# Patient Record
Sex: FEMALE | Race: WHITE | NOT HISPANIC OR LATINO | Employment: FULL TIME | URBAN - METROPOLITAN AREA
[De-identification: names, ages, dates, MRNs, and addresses within clinical notes are randomized per-mention and may not be internally consistent; named-entity substitution may affect disease eponyms.]

---

## 2021-01-19 ENCOUNTER — OFFICE VISIT (OUTPATIENT)
Dept: MEDICAL GROUP | Facility: PHYSICIAN GROUP | Age: 61
End: 2021-01-19
Payer: COMMERCIAL

## 2021-01-19 VITALS
WEIGHT: 197 LBS | OXYGEN SATURATION: 95 % | TEMPERATURE: 98.1 F | BODY MASS INDEX: 36.25 KG/M2 | DIASTOLIC BLOOD PRESSURE: 86 MMHG | HEIGHT: 62 IN | HEART RATE: 82 BPM | SYSTOLIC BLOOD PRESSURE: 130 MMHG | RESPIRATION RATE: 16 BRPM

## 2021-01-19 DIAGNOSIS — F17.210 CIGARETTE NICOTINE DEPENDENCE WITHOUT COMPLICATION: ICD-10-CM

## 2021-01-19 DIAGNOSIS — E66.3 OVERWEIGHT: ICD-10-CM

## 2021-01-19 DIAGNOSIS — G89.29 CHRONIC RIGHT-SIDED LOW BACK PAIN WITH RIGHT-SIDED SCIATICA: ICD-10-CM

## 2021-01-19 DIAGNOSIS — Z80.3 FAMILY HISTORY OF BREAST CANCER: ICD-10-CM

## 2021-01-19 DIAGNOSIS — Z11.59 NEED FOR HEPATITIS C SCREENING TEST: ICD-10-CM

## 2021-01-19 DIAGNOSIS — M54.41 CHRONIC RIGHT-SIDED LOW BACK PAIN WITH RIGHT-SIDED SCIATICA: ICD-10-CM

## 2021-01-19 DIAGNOSIS — Z12.31 ENCOUNTER FOR SCREENING MAMMOGRAM FOR BREAST CANCER: ICD-10-CM

## 2021-01-19 DIAGNOSIS — L98.9 SCALP LESION: ICD-10-CM

## 2021-01-19 DIAGNOSIS — M54.16 LUMBAR RADICULOPATHY: ICD-10-CM

## 2021-01-19 PROBLEM — M54.50 CHRONIC LOW BACK PAIN: Status: ACTIVE | Noted: 2021-01-19

## 2021-01-19 PROCEDURE — 99204 OFFICE O/P NEW MOD 45 MIN: CPT | Performed by: INTERNAL MEDICINE

## 2021-01-19 SDOH — HEALTH STABILITY: MENTAL HEALTH: HOW OFTEN DO YOU HAVE A DRINK CONTAINING ALCOHOL?: 4 OR MORE TIMES A WEEK

## 2021-01-19 ASSESSMENT — PATIENT HEALTH QUESTIONNAIRE - PHQ9: CLINICAL INTERPRETATION OF PHQ2 SCORE: 0

## 2021-01-19 NOTE — ASSESSMENT & PLAN NOTE
This is a chronic condition.  Strongly advised the patient is important to quit smoking.  The patient however is not ready.

## 2021-01-19 NOTE — PROGRESS NOTES
CC: Establish care  Scalp lesion  Sciatica      HPI: This is a 60 y.o. pt.  Pt's medical history is notable for:     Chronic low back pain  Chronic cond  Sx noted since aug 2020  Location: LS region  Context: denies recent trauma or injury.   Severity: moderate  Quality of pain: sharp, throbbing   Duration: up to several hrs  Associating symptoms: Denies bowel/bladder dysfunction. Denies saddle anesthesia. Denies sudden onset of weakness in lower ext bilat. Denies F/C. Pain radiating onto RLE   Associating w numbness  Aggravating factors: spine movements, prolonged standing, walking    Cigarette nicotine dependence without complication  This is a chronic condition.  Strongly advised the patient is important to quit smoking.  The patient however is not ready.    Family history of breast cancer  Patient reported her sister was diagnosed with breast cancer two times.  Mammogram ordered for follow-up.  The patient is asymptomatic.    Lumbar radiculopathy  This is a chronic condition noted since August 2020.  Patient reported that her symptom has gotten worse since packing and moving to [place] from [place].  Patient complaining of numbness and tingling sensation affecting the low back radiating onto the right lower extremities.  She denies bowel bladder dysfunction    Scalp lesion  Patient complains of chronic scalp lesion noted in the left parietal region since the past 2 years.  Patient requests referral to see dermatologist.          REVIEW OF SYSTEMS:       Eyes: no changes in vision  ENT: no sore throat, no hearing loss  CV:  no chest pain, no palpitations  Pulmonary: no SOB, no cough    GI: no nausea / vomiting, no diarrhea, no constipation  :  no dysuria, no hematuria       Allergies: Cindy-kit bee sting    No current Epic-ordered outpatient medications on file.     No current [facility]-ordered facility-administered medications on file.        History reviewed. No pertinent past medical history.     History reviewed.  No pertinent surgical history.     Family History   Problem Relation Age of Onset   • Cancer Mother         colon   • Cancer Father    • Cancer Sister         Social History     Tobacco Use   Smoking Status Current Every Day Smoker   • Packs/day: 0.50   Smokeless Tobacco Never Used          Social History     Substance and Sexual Activity   Alcohol Use Yes   • Alcohol/week: 0.6 oz   • Types: 1 Glasses of wine per week   • Frequency: 4 or more times a week         ------------------------------------------------------------------------------     PHYSICAL EXAM:   Vitals:    21 1339   BP: 130/86   Pulse: 82   Resp: 16   Temp: 36.7 °C (98.1 °F)   SpO2: 95%      Body mass index is 36.03 kg/m².         Constitutional: no acute distress  Neck: supple, no JVD  CV: heart RRR  Resp: normal effort, no wheezing or rales.  GI: abdomen soft, no obvious mass, no tenderness  Neuro: CN 2-12 grossly intact  Low back: No significant spinal deformity noted.   Pain noted w palpation of the lumbar region.  ROM : flexion, extension, rotation, lateral bend of back limited due to pain  Scalp in the left parietal region show dry scaly area noted with crusting/plaques.  No fluctuance redness or any discharge noted.        -----------------------------------------------------------------------------    ASSESSMENT:   1. Chronic right-sided low back pain with right-sided sciatica     2. Lumbar radiculopathy  MR-LUMBAR SPINE-W/O   3. Scalp lesion  REFERRAL TO DERMATOLOGY   4. Cigarette nicotine dependence without complication     5. Overweight  HEMOGLOBIN A1C    ALANINE AMINO-TRANS    Basic Metabolic Panel    CBC WITH DIFFERENTIAL    Lipid Profile    TSH    MICROALBUMIN CREAT RATIO URINE   6. Family history of breast cancer     7. Need for hepatitis C screening test  HCV Scrn ( [id number] [facility])   8. Encounter for screening mammogram for breast cancer  MA-SCREENING MAMMO BILAT W/TOMOSYNTHESIS W/CAD           MEDICAL DECISION MAKING:  DISCUSSION / STATUS / PLAN:    Low back pain/lumbar radiculopathy.  This is a chronic condition, uncontrolled.  MRI of the LS spine ordered.  Back care posture stretch exercise recommended advised the patient to follow-up after MRI is done    Scalp lesion.  Chronic, uncontrolled.  Refer the patient to dermatology.    Nicotine dependence.  Strongly advised the patient to quit smoking.    General lab tests ordered today.  Advised the patient to lose weight diet and exercise advised.     Patient reported that she had colonoscopy done 2019.  Advised the patient to obtain the chart for review.     Return in about 6 months (around 7/19/2021).       PATIENT EDUCATION:  -If any problems should arise, patient was advised to contact our office or go to ER to be evaluated.      Please note that this dictation was created using voice recognition software. I have made every reasonable attempt to correct obvious errors, but it is possible there are errors of grammar and possibly content that I did not discover before finalizing the note.

## 2021-01-19 NOTE — ASSESSMENT & PLAN NOTE
Patient reported her sister was diagnosed with breast cancer two times.  Mammogram ordered for follow-up.  The patient is asymptomatic.

## 2021-01-19 NOTE — ASSESSMENT & PLAN NOTE
Chronic cond  Sx noted since aug 2020  Location:  region  Context: denies recent trauma or injury.   Severity: moderate  Quality of pain: sharp, throbbing   Duration: up to several hrs  Associating symptoms: Denies bowel/bladder dysfunction. Denies saddle anesthesia. Denies sudden onset of weakness in lower ext bilat. Denies F/C. Pain radiating onto RLE   Associating w numbness  Aggravating factors: spine movements, prolonged standing, walking

## 2021-01-19 NOTE — ASSESSMENT & PLAN NOTE
Patient complains of chronic scalp lesion noted in the left parietal region since the past 2 years.  Patient requests referral to see dermatologist.

## 2021-01-19 NOTE — ASSESSMENT & PLAN NOTE
This is a chronic condition noted since August 2020.  Patient reported that her symptom has gotten worse since packing and moving to [place] from [place].  Patient complaining of numbness and tingling sensation affecting the low back radiating onto the right lower extremities.  She denies bowel bladder dysfunction

## 2021-02-10 ENCOUNTER — OFFICE VISIT (OUTPATIENT)
Dept: DERMATOLOGY | Facility: IMAGING CENTER | Age: 61
End: 2021-02-10
Payer: COMMERCIAL

## 2021-02-10 DIAGNOSIS — D48.5 NEOPLASM OF UNCERTAIN BEHAVIOR OF SKIN: ICD-10-CM

## 2021-02-10 PROCEDURE — 11105 PUNCH BX SKIN EA SEP/ADDL: CPT | Performed by: DERMATOLOGY

## 2021-02-10 PROCEDURE — 11104 PUNCH BX SKIN SINGLE LESION: CPT | Performed by: DERMATOLOGY

## 2021-02-10 NOTE — PROGRESS NOTES
DERMATOLOGY NOTE  NEW VISIT       Chief complaint: Skin Lesion       HPI/location: lt scalp   Time present: 2013  Painful lesion: No   Itching lesion: Yes  Enlarging lesion: Yes  Anything make it better or worse? no    History of skin cancer: No  History of precancers/actinic keratoses: No  History of biopsies:No  History of blistering/severe sunburns:Yes, Details: child   Family history of skin cancer:Yes, Details: dad,   Family history of atypical moles:No    History reviewed. No pertinent past medical history.     Family History   Problem Relation Age of Onset   • Cancer Mother         colon   • Cancer Father    • Cancer Sister         Social History     Socioeconomic History   • Marital status:      Spouse name: Not on file   • Number of children: Not on file   • Years of education: Not on file   • Highest education level: Not on file   Occupational History   • Not on file   Tobacco Use   • Smoking status: Current Every Day Smoker     Packs/day: 0.50   • Smokeless tobacco: Never Used   Substance and Sexual Activity   • Alcohol use: Yes     Alcohol/week: 0.6 oz     Types: 1 Glasses of wine per week   • Drug use: Never   • Sexual activity: Not on file   Other Topics Concern   • Not on file   Social History Narrative   • Not on file     Social Determinants of Health     Financial Resource Strain:    • Difficulty of Paying Living Expenses:    Food Insecurity:    • Worried About Running Out of Food in the Last Year:    • Ran Out of Food in the Last Year:    Transportation Needs:    • Lack of Transportation (Medical):    • Lack of Transportation (Non-Medical):    Physical Activity:    • Days of Exercise per Week:    • Minutes of Exercise per Session:    Stress:    • Feeling of Stress :    Social Connections:    • Frequency of Communication with Friends and Family:    • Frequency of Social Gatherings with Friends and Family:    • Attends Religion Services:    • Active Member of Clubs or Organizations:    •  Attends Club or Organization Meetings:    • Marital Status:    Intimate Partner Violence:    • Fear of Current or Ex-Partner:    • Emotionally Abused:    • Physically Abused:    • Sexually Abused:         Allergies   Allergen Reactions   • Cindy-Kit Bee Sting Anaphylaxis        MEDICATIONS:  Medications relevant to specialty reviewed.  No current outpatient medications on file.     REVIEW OF SYSTEMS:   Positive for skin (see HPI)  Negative for fevers, chills and cough       EXAM:  There were no vitals taken for this visit.  Constitutional: Well-developed, well-nourished, and in no distress.     A focused skin exam was performed including the affected areas of the head (including face). Notable findings on exam today listed below and/or in assessment/plan.   -see exam in plan       IMPRESSION / PLAN:    1. Neoplasm of uncertain behavior of skin  Exam: left frontal/temporal scalp with large pink somewhat shiny plaque (6cm x 12 cm) with overlying thick hyperkeratotic scales  Remainder of scalp with mild scaling c/w seb derm  - Discussed option for biopsy to aid in diagnosis, patient in agreement  - Biopsy Procedure Note: biopsy by punch technique  - Location(s): Left scalp 1; Left scalp 2 (both sites from same lesion)  - Photo taken with patient permission (see media tab)  - R/O SCC vs BCC vs pityriasis amiantacea (psoriasis/seb derm/tinea capitis?)  - Plan pending path     Punch biopsy x 2  After informed, written consent regarding the risks of scar, bleeding infection, numbness/nerve damage and discoloration was obtained and site confirmation with the patient, the site was cleaned with Hibiclens and infiltrated with 1% Lidocaine with epinephrine anesthesia.  A 4.0 mm punch biopsy was performed and hemostasis was achieved with 4.0 prolene suture.  The patient tolerated the procedure without incident.  Topical vaseline and a wound dressing was applied.  Wound care instructions were verbally discussed and a handout  provided.   The specimen was labeled and sent to pathology for evaluation.  Suture to be removed in 12-14 days.        Return to clinic in: Return 2 weeks for suture removal/results discussion. and as needed for any new or changing skin lesions.    Alejandrina Greene M.D.

## 2021-02-16 ENCOUNTER — TELEPHONE (OUTPATIENT)
Dept: DERMATOLOGY | Facility: IMAGING CENTER | Age: 61
End: 2021-02-16

## 2021-02-16 NOTE — TELEPHONE ENCOUNTER
Phone Number Called: [phone] (home)       Call outcome: Spoke to patient regarding message below.    Message: about results and treatment will ne discussed at next appt.  D- Path was scanned in.   Patient did not have further questions.

## 2021-02-23 ENCOUNTER — OFFICE VISIT (OUTPATIENT)
Dept: DERMATOLOGY | Facility: IMAGING CENTER | Age: 61
End: 2021-02-23
Payer: COMMERCIAL

## 2021-02-23 DIAGNOSIS — L30.8 SPONGIOTIC DERMATITIS: ICD-10-CM

## 2021-02-23 PROCEDURE — 99213 OFFICE O/P EST LOW 20 MIN: CPT | Performed by: DERMATOLOGY

## 2021-02-23 RX ORDER — FLUOCINOLONE ACETONIDE 0.11 MG/ML
1 OIL TOPICAL DAILY
Qty: 120 ML | Refills: 3 | Status: SHIPPED | OUTPATIENT
Start: 2021-02-23

## 2021-02-23 RX ORDER — BETAMETHASONE DIPROPIONATE 0.05 %
OINTMENT (GRAM) TOPICAL
Qty: 45 G | Refills: 2 | Status: SHIPPED | OUTPATIENT
Start: 2021-02-23

## 2021-02-23 RX ORDER — BETAMETHASONE DIPROPIONATE 0.05 %
OINTMENT (GRAM) TOPICAL
Qty: 45 G | Refills: 2 | Status: SHIPPED | OUTPATIENT
Start: 2021-02-23 | End: 2021-02-23 | Stop reason: SDUPTHER

## 2021-02-23 RX ORDER — FLUOCINOLONE ACETONIDE 0.11 MG/ML
1 OIL TOPICAL DAILY
Qty: 120 ML | Refills: 3 | Status: SHIPPED | OUTPATIENT
Start: 2021-02-23 | End: 2021-02-23 | Stop reason: SDUPTHER

## 2021-02-23 NOTE — PROGRESS NOTES
DERMATOLOGY NOTE  FOLLOW UP VISIT       Chief complaint: Follow-Up (Results)     Patient returns today for suture removal and review of pathology results.    From previous note:  HPI/location: lt scalp   Time present: 2013  Painful lesion: No   Itching lesion: Yes  Enlarging lesion: Yes  Anything make it better or worse? no    History of skin cancer: No  History of precancers/actinic keratoses: No  History of biopsies:No  History of blistering/severe sunburns:Yes, Details: child   Family history of skin cancer:Yes, Details: dad,   Family history of atypical moles:No    History reviewed. No pertinent past medical history.     Family History   Problem Relation Age of Onset   • Cancer Mother         colon   • Cancer Father    • Cancer Sister         Social History     Socioeconomic History   • Marital status:      Spouse name: Not on file   • Number of children: Not on file   • Years of education: Not on file   • Highest education level: Not on file   Occupational History   • Not on file   Tobacco Use   • Smoking status: Current Every Day Smoker     Packs/day: 0.50   • Smokeless tobacco: Never Used   Substance and Sexual Activity   • Alcohol use: Yes     Alcohol/week: 0.6 oz     Types: 1 Glasses of wine per week   • Drug use: Never   • Sexual activity: Not on file   Other Topics Concern   • Not on file   Social History Narrative   • Not on file     Social Determinants of Health     Financial Resource Strain:    • Difficulty of Paying Living Expenses:    Food Insecurity:    • Worried About Running Out of Food in the Last Year:    • Ran Out of Food in the Last Year:    Transportation Needs:    • Lack of Transportation (Medical):    • Lack of Transportation (Non-Medical):    Physical Activity:    • Days of Exercise per Week:    • Minutes of Exercise per Session:    Stress:    • Feeling of Stress :    Social Connections:    • Frequency of Communication with Friends and Family:    • Frequency of Social Gatherings  with Friends and Family:    • Attends [employer] Services:    • Active Member of Clubs or Organizations:    • Attends Club or Organization Meetings:    • Marital Status:    Intimate Partner Violence:    • Fear of Current or Ex-Partner:    • Emotionally Abused:    • Physically Abused:    • Sexually Abused:         Allergies   Allergen Reactions   • Cindy-Kit Bee Sting Anaphylaxis        MEDICATIONS:  Medications relevant to specialty reviewed.    Current Outpatient Medications:   •  betamethasone dipropionate (DIPROLENE) 0.05 % Ointment, Apply to scalp at night, wash off in am, Disp: 45 g, Rfl: 2  •  Fluocinolone Acetonide Scalp 0.01 % Oil, Apply 1 Application topically every day. To affected area of scalp, cover with shower cap, rinse out in am, Disp: 120 mL, Rfl: 3     REVIEW OF SYSTEMS:   Positive for skin (see HPI)  Negative for fevers, chills and cough       EXAM:  There were no vitals taken for this visit.  Constitutional: Well-developed, well-nourished, and in no distress.     A focused skin exam was performed including the affected areas of the head (including face). Notable findings on exam today listed below and/or in assessment/plan.   -see exam in plan       IMPRESSION / PLAN:    1. Spongiotic dermatitis of scalp, chronic, uncontrolled  Exam: left frontal/temporal scalp with large pink somewhat shiny plaque (6cm x 12 cm) with overlying thick hyperkeratotic scales  Bx 2/10/21 c/w spongiotic dermatitis  Favor eczema, possible component of allergic contact derm vs sebopsoriasis/seb derm/other inflammatory dermatitis  - start T/Sal shampoo (let sit 5-10 min, then rinse)  - then apply betamethasone 0.05% oint at night, wash off in AM  - may also try fluocinolone oil at night, wash off in AM if vehicle is easier to use  Re-eval in 4 weeks, if not improving, consider trial of ILK vs soaking scale in dilute hydrogen peroxide     Return to clinic in: Return in about 4 weeks (around 3/23/2021). and as needed for any  new or changing skin lesions.    Alejandrina Greene M.D.

## 2021-03-23 ENCOUNTER — OFFICE VISIT (OUTPATIENT)
Dept: DERMATOLOGY | Facility: IMAGING CENTER | Age: 61
End: 2021-03-23
Payer: COMMERCIAL

## 2021-03-23 DIAGNOSIS — L30.8 SPONGIOTIC DERMATITIS: ICD-10-CM

## 2021-03-23 PROCEDURE — 99213 OFFICE O/P EST LOW 20 MIN: CPT | Performed by: DERMATOLOGY

## 2021-03-23 NOTE — PROGRESS NOTES
DERMATOLOGY NOTE  FOLLOW UP VISIT       Chief complaint: Follow-Up  Spongiotic dermatitis of scalp  Per patient is doing much better, thick crusty patches has cleared.   No new concerns today     From previous note:  HPI/location: lt scalp   Time present: 2013  Painful lesion: No   Itching lesion: Yes  Enlarging lesion: Yes  Anything make it better or worse? no    History of skin cancer: No  History of precancers/actinic keratoses: No  History of biopsies:No  History of blistering/severe sunburns:Yes, Details: child   Family history of skin cancer:Yes, Details: dad,   Family history of atypical moles:No    History reviewed. No pertinent past medical history.     Family History   Problem Relation Age of Onset   • Cancer Mother         colon   • Cancer Father    • Cancer Sister         Social History     Socioeconomic History   • Marital status:      Spouse name: Not on file   • Number of children: Not on file   • Years of education: Not on file   • Highest education level: Not on file   Occupational History   • Not on file   Tobacco Use   • Smoking status: Current Every Day Smoker     Packs/day: 0.50   • Smokeless tobacco: Never Used   Substance and Sexual Activity   • Alcohol use: Yes     Alcohol/week: 0.6 oz     Types: 1 Glasses of wine per week   • Drug use: Never   • Sexual activity: Not on file   Other Topics Concern   • Not on file   Social History Narrative   • Not on file     Social Determinants of Health     Financial Resource Strain:    • Difficulty of Paying Living Expenses:    Food Insecurity:    • Worried About Running Out of Food in the Last Year:    • Ran Out of Food in the Last Year:    Transportation Needs:    • Lack of Transportation (Medical):    • Lack of Transportation (Non-Medical):    Physical Activity:    • Days of Exercise per Week:    • Minutes of Exercise per Session:    Stress:    • Feeling of Stress :    Social Connections:    • Frequency of Communication with Friends and Family:     • Frequency of Social Gatherings with Friends and Family:    • Attends [employer] Services:    • Active Member of Clubs or Organizations:    • Attends Club or Organization Meetings:    • Marital Status:    Intimate Partner Violence:    • Fear of Current or Ex-Partner:    • Emotionally Abused:    • Physically Abused:    • Sexually Abused:         Allergies   Allergen Reactions   • Cindy-Kit Bee Sting Anaphylaxis        MEDICATIONS:  Medications relevant to specialty reviewed.    Current Outpatient Medications:   •  betamethasone dipropionate (DIPROLENE) 0.05 % Ointment, Apply to scalp at night, wash off in am, Disp: 45 g, Rfl: 2  •  Fluocinolone Acetonide Scalp 0.01 % Oil, Apply 1 Application topically every day. To affected area of scalp, cover with shower cap, rinse out in am, Disp: 120 mL, Rfl: 3     REVIEW OF SYSTEMS:   Positive for skin (see HPI)  Negative for fevers, chills and cough       EXAM:  There were no vitals taken for this visit.  Constitutional: Well-developed, well-nourished, and in no distress.     A focused skin exam was performed including the affected areas of the head (including face). Notable findings on exam today listed below and/or in assessment/plan.   -see exam in plan       IMPRESSION / PLAN:    1. Spongiotic dermatitis of scalp, chronic - improved  Exam: left frontal/temporal scalp with large patch of shorter hairs, parietal border with smaller scaly pink plaque (overall largely resolved from last exam 2/23/21)  Bx 2/10/21 c/w spongiotic dermatitis  Favor eczema, possible component of allergic contact derm vs sebopsoriasis/seb derm/other inflammatory dermatitis  - cont T/Sal shampoo (let sit 5-10 min, then rinse)  - stop betamethasone 0.05% oint at night, wash off in AM  - may cont fluocinolone oil at night, wash off in AM to one residual plaque at parietal border until smooth  - rtc if recurs or uncontrolled with medications     Return to clinic in: Return if symptoms worsen or fail to  improve. and as needed for any new or changing skin lesions.    I have performed a physical exam and reviewed and updated ROS and Plan today (3/23/2021). In review of dermatology visit (2/23/21), there are no changes except as documented above.     Alejandrina Greene M.D.

## 2021-07-21 ENCOUNTER — OFFICE VISIT (OUTPATIENT)
Dept: MEDICAL GROUP | Facility: PHYSICIAN GROUP | Age: 61
End: 2021-07-21
Payer: COMMERCIAL

## 2021-07-21 VITALS
HEIGHT: 62 IN | TEMPERATURE: 97.5 F | HEART RATE: 92 BPM | OXYGEN SATURATION: 94 % | BODY MASS INDEX: 34.41 KG/M2 | SYSTOLIC BLOOD PRESSURE: 126 MMHG | DIASTOLIC BLOOD PRESSURE: 88 MMHG | WEIGHT: 187 LBS | RESPIRATION RATE: 16 BRPM

## 2021-07-21 DIAGNOSIS — Z91.030 HISTORY OF BEE STING ALLERGY: ICD-10-CM

## 2021-07-21 DIAGNOSIS — F17.210 CIGARETTE NICOTINE DEPENDENCE WITHOUT COMPLICATION: ICD-10-CM

## 2021-07-21 DIAGNOSIS — Z80.3 FAMILY HISTORY OF BREAST CANCER: ICD-10-CM

## 2021-07-21 DIAGNOSIS — Z23 NEED FOR VACCINATION: ICD-10-CM

## 2021-07-21 PROCEDURE — 90471 IMMUNIZATION ADMIN: CPT | Performed by: INTERNAL MEDICINE

## 2021-07-21 PROCEDURE — 99214 OFFICE O/P EST MOD 30 MIN: CPT | Mod: 25 | Performed by: INTERNAL MEDICINE

## 2021-07-21 PROCEDURE — 90732 PPSV23 VACC 2 YRS+ SUBQ/IM: CPT | Performed by: INTERNAL MEDICINE

## 2021-07-21 RX ORDER — EPINEPHRINE 0.3 MG/.3ML
0.3 INJECTION SUBCUTANEOUS ONCE
Qty: 1 EACH | Refills: 1 | Status: SHIPPED | OUTPATIENT
Start: 2021-07-21 | End: 2021-07-21

## 2021-07-21 RX ORDER — ALBUTEROL SULFATE 90 UG/1
1-2 AEROSOL, METERED RESPIRATORY (INHALATION) EVERY 6 HOURS PRN
Qty: 1 EACH | Refills: 6 | Status: SHIPPED | OUTPATIENT
Start: 2021-07-21

## 2021-07-21 NOTE — PROGRESS NOTES
"CC: Refill EpiPen      HPI: This is a 61 y.o. pt.  Pt's medical history is notable for:     History of bee sting allergy  Patient currently asymptomatic.  She is requesting a refill for EpiPen.    Cigarette nicotine dependence without complication  Chronic condition.  The patient is advised to quit smoking completely.    Family history of breast cancer  Patient has not schedule appointment for mammogram.  Strongly advised the patient is important to set up the appointment ASAP.  Patient denies breast lump or any symptoms          REVIEW OF SYSTEMS:     Constitutional:  no fever / chills   Neurologic: no headaches  Eyes: no changes in vision  ENT: no sore throat, no hearing loss  CV:  no chest pain, no palpitations  Pulmonary: no SOB, no cough          Allergies: Cindy-kit bee sting    Current Outpatient Medications Ordered in Epic   Medication Sig Dispense Refill   • EPINEPHrine (EPIPEN) 0.3 MG/0.3ML Solution Auto-injector solution for injection Inject 0.3 mL into the shoulder, thigh, or buttocks one time for 1 dose. 1 Each 1   • albuterol 108 (90 Base) MCG/ACT Aero Soln inhalation aerosol Inhale 1-2 Puffs every 6 hours as needed for Shortness of Breath. 1 Each 6   • betamethasone dipropionate (DIPROLENE) 0.05 % Ointment Apply to scalp at night, wash off in am 45 g 2   • Fluocinolone Acetonide Scalp 0.01 % Oil Apply 1 Application topically every day. To affected area of scalp, cover with shower cap, rinse out in am 120 mL 3     No current [facility]-ordered facility-administered medications on file.       Past Medical, Social, and Family history reviewed and updated in EPIC     ------------------------------------------------------------------------------     PHYSICAL EXAM:   Vitals:    07/21/21 1124   BP: 126/88   Pulse: 92   Resp: 16   Temp: 36.4 °C (97.5 °F)   SpO2: 94%        Vitals:    07/21/21 1124   BP: 126/88   Weight: 84.8 kg (187 lb)   Height: 1.575 m (5' 2\")         Body mass index is 34.2 " kg/m².    Constitutional: no acute distress  CV: heart RRR  Resp: normal effort, minimal expiratory wheezing noted GI: abdomen soft, no obvious mass, no tenderness  Neuro: CN 2-12 grossly intact        -----------------------------------------------------------------------------    ASSESSMENT:   1. Need for vaccination  Pneumovax Vaccine (PPSV23)    CANCELED: Tdap Vaccine =>6YO IM    CANCELED: Shingrix Vaccine   2. History of bee sting allergy     3. Cigarette nicotine dependence without complication     4. Family history of breast cancer             MEDICAL DECISION MAKING: DISCUSSION / STATUS / PLAN:    Refill EpiPen.  Reminded patient to schedule an appointment for mammogram and to get lab work done ordered previously.  Strongly advised the patient is important to quit smoking.  Patient is not ready.     Return in about 6 months (around 1/21/2022).     -If any problems should arise, patient was advised to contact our office or go to ER to be evaluated.    Please note that this dictation was created using voice recognition software. I have made every reasonable attempt to correct obvious errors, but it is possible there are errors of grammar and possibly content that I did not discover before finalizing the note.

## 2021-07-21 NOTE — ASSESSMENT & PLAN NOTE
Patient has not schedule appointment for mammogram.  Strongly advised the patient is important to set up the appointment ASAP.  Patient denies breast lump or any symptoms

## 2021-08-05 ENCOUNTER — HOSPITAL ENCOUNTER (OUTPATIENT)
Dept: LAB | Facility: MEDICAL CENTER | Age: 61
End: 2021-08-05
Attending: INTERNAL MEDICINE
Payer: COMMERCIAL

## 2021-08-05 DIAGNOSIS — Z11.59 NEED FOR HEPATITIS C SCREENING TEST: ICD-10-CM

## 2021-08-05 DIAGNOSIS — E66.3 OVERWEIGHT: ICD-10-CM

## 2021-08-05 DIAGNOSIS — D75.1 ERYTHROCYTOSIS: ICD-10-CM

## 2021-08-05 PROBLEM — E78.5 HYPERLIPIDEMIA: Status: ACTIVE | Noted: 2021-08-05

## 2021-08-05 LAB
ALT SERPL-CCNC: 26 U/L (ref 2–50)
ANION GAP SERPL CALC-SCNC: 12 MMOL/L (ref 7–16)
BASOPHILS # BLD AUTO: 0.6 % (ref 0–1.8)
BASOPHILS # BLD: 0.05 K/UL (ref 0–0.12)
BUN SERPL-MCNC: 9 MG/DL (ref 8–22)
CALCIUM SERPL-MCNC: 9.9 MG/DL (ref 8.5–10.5)
CHLORIDE SERPL-SCNC: 101 MMOL/L (ref 96–112)
CHOLEST SERPL-MCNC: 238 MG/DL (ref 100–199)
CO2 SERPL-SCNC: 26 MMOL/L (ref 20–33)
CREAT SERPL-MCNC: 0.73 MG/DL (ref 0.5–1.4)
CREAT UR-MCNC: 111.2 MG/DL
EOSINOPHIL # BLD AUTO: 0.14 K/UL (ref 0–0.51)
EOSINOPHIL NFR BLD: 1.7 % (ref 0–6.9)
ERYTHROCYTE [DISTWIDTH] IN BLOOD BY AUTOMATED COUNT: 50.5 FL (ref 35.9–50)
EST. AVERAGE GLUCOSE BLD GHB EST-MCNC: 103 MG/DL
FASTING STATUS PATIENT QL REPORTED: NORMAL
GLUCOSE SERPL-MCNC: 88 MG/DL (ref 65–99)
HBA1C MFR BLD: 5.2 % (ref 4–5.6)
HCT VFR BLD AUTO: 55.3 % (ref 37–47)
HCV AB SER QL: NORMAL
HDLC SERPL-MCNC: 51 MG/DL
HGB BLD-MCNC: 18 G/DL (ref 12–16)
IMM GRANULOCYTES # BLD AUTO: 0.03 K/UL (ref 0–0.11)
IMM GRANULOCYTES NFR BLD AUTO: 0.4 % (ref 0–0.9)
LDLC SERPL CALC-MCNC: 149 MG/DL
LYMPHOCYTES # BLD AUTO: 2.81 K/UL (ref 1–4.8)
LYMPHOCYTES NFR BLD: 35 % (ref 22–41)
MCH RBC QN AUTO: 32 PG (ref 27–33)
MCHC RBC AUTO-ENTMCNC: 32.5 G/DL (ref 33.6–35)
MCV RBC AUTO: 98.4 FL (ref 81.4–97.8)
MICROALBUMIN UR-MCNC: 1.7 MG/DL
MICROALBUMIN/CREAT UR: 15 MG/G (ref 0–30)
MONOCYTES # BLD AUTO: 0.6 K/UL (ref 0–0.85)
MONOCYTES NFR BLD AUTO: 7.5 % (ref 0–13.4)
NEUTROPHILS # BLD AUTO: 4.41 K/UL (ref 2–7.15)
NEUTROPHILS NFR BLD: 54.8 % (ref 44–72)
NRBC # BLD AUTO: 0 K/UL
NRBC BLD-RTO: 0 /100 WBC
PLATELET # BLD AUTO: 247 K/UL (ref 164–446)
PMV BLD AUTO: 11.5 FL (ref 9–12.9)
POTASSIUM SERPL-SCNC: 5.1 MMOL/L (ref 3.6–5.5)
RBC # BLD AUTO: 5.62 M/UL (ref 4.2–5.4)
SODIUM SERPL-SCNC: 139 MMOL/L (ref 135–145)
TRIGL SERPL-MCNC: 188 MG/DL (ref 0–149)
TSH SERPL DL<=0.005 MIU/L-ACNC: 2.11 UIU/ML (ref 0.38–5.33)
WBC # BLD AUTO: 8 K/UL (ref 4.8–10.8)

## 2021-08-05 PROCEDURE — 82043 UR ALBUMIN QUANTITATIVE: CPT

## 2021-08-05 PROCEDURE — 80048 BASIC METABOLIC PNL TOTAL CA: CPT

## 2021-08-05 PROCEDURE — 82570 ASSAY OF URINE CREATININE: CPT

## 2021-08-05 PROCEDURE — 36415 COLL VENOUS BLD VENIPUNCTURE: CPT

## 2021-08-05 PROCEDURE — 85025 COMPLETE CBC W/AUTO DIFF WBC: CPT

## 2021-08-05 PROCEDURE — 83036 HEMOGLOBIN GLYCOSYLATED A1C: CPT

## 2021-08-05 PROCEDURE — 80061 LIPID PANEL: CPT

## 2021-08-05 PROCEDURE — 84460 ALANINE AMINO (ALT) (SGPT): CPT

## 2021-08-05 PROCEDURE — 84443 ASSAY THYROID STIM HORMONE: CPT

## 2021-08-05 PROCEDURE — G0472 HEP C SCREEN HIGH RISK/OTHER: HCPCS

## 2021-08-05 RX ORDER — ATORVASTATIN CALCIUM 10 MG/1
10 TABLET, FILM COATED ORAL DAILY
Qty: 30 TABLET | Refills: 6 | Status: SHIPPED | OUTPATIENT
Start: 2021-08-05

## 2021-08-06 ENCOUNTER — TELEPHONE (OUTPATIENT)
Dept: MEDICAL GROUP | Facility: PHYSICIAN GROUP | Age: 61
End: 2021-08-06

## 2021-08-06 NOTE — TELEPHONE ENCOUNTER
Informed pt of Dr Worrell's message about her recent blood works results.  Pt is also aware of the Atorvastatin that was sent to her pharmacy.  And she is aware of the repeat lab in about 2 weeks.

## 2022-02-14 ENCOUNTER — OFFICE VISIT (OUTPATIENT)
Dept: MEDICAL GROUP | Facility: PHYSICIAN GROUP | Age: 62
End: 2022-02-14
Payer: COMMERCIAL

## 2022-02-14 DIAGNOSIS — Z23 NEED FOR VACCINATION: ICD-10-CM

## 2022-02-14 DIAGNOSIS — H61.23 BILATERAL IMPACTED CERUMEN: ICD-10-CM

## 2022-02-14 DIAGNOSIS — Z12.31 ENCOUNTER FOR SCREENING MAMMOGRAM FOR BREAST CANCER: ICD-10-CM

## 2022-02-14 PROCEDURE — 69209 REMOVE IMPACTED EAR WAX UNI: CPT | Mod: 50 | Performed by: INTERNAL MEDICINE

## 2022-02-14 ASSESSMENT — PATIENT HEALTH QUESTIONNAIRE - PHQ9: CLINICAL INTERPRETATION OF PHQ2 SCORE: 0

## 2022-02-15 VITALS — WEIGHT: 187 LBS | BODY MASS INDEX: 34.41 KG/M2 | RESPIRATION RATE: 18 BRPM | HEIGHT: 62 IN

## 2022-02-15 NOTE — NON-PROVIDER
Tonya Rivas is a 61 y.o. female here for a non-provider visit for ear lavage    If abnormal was an in office provider notified today (if so, indicate provider)? No    Routed to PCP? Yes

## 2022-03-03 PROBLEM — H61.20 CERUMEN IMPACTION: Status: ACTIVE | Noted: 2022-03-03
